# Patient Record
Sex: FEMALE | Race: WHITE | HISPANIC OR LATINO
[De-identification: names, ages, dates, MRNs, and addresses within clinical notes are randomized per-mention and may not be internally consistent; named-entity substitution may affect disease eponyms.]

---

## 2021-03-04 ENCOUNTER — RESULT REVIEW (OUTPATIENT)
Age: 61
End: 2021-03-04

## 2023-01-12 ENCOUNTER — APPOINTMENT (OUTPATIENT)
Dept: INTERNAL MEDICINE | Facility: CLINIC | Age: 63
End: 2023-01-12
Payer: COMMERCIAL

## 2023-01-12 VITALS
WEIGHT: 208 LBS | BODY MASS INDEX: 36.86 KG/M2 | HEART RATE: 83 BPM | RESPIRATION RATE: 17 BRPM | TEMPERATURE: 98 F | SYSTOLIC BLOOD PRESSURE: 122 MMHG | HEIGHT: 63 IN | DIASTOLIC BLOOD PRESSURE: 82 MMHG | OXYGEN SATURATION: 99 %

## 2023-01-12 LAB
GLUCOSE BLDC GLUCOMTR-MCNC: 133
HBA1C MFR BLD HPLC: 6.9

## 2023-01-12 PROCEDURE — 83036 HEMOGLOBIN GLYCOSYLATED A1C: CPT | Mod: QW

## 2023-01-12 PROCEDURE — 99214 OFFICE O/P EST MOD 30 MIN: CPT | Mod: 25

## 2023-01-12 PROCEDURE — 99401 PREV MED CNSL INDIV APPRX 15: CPT

## 2023-01-12 PROCEDURE — 82962 GLUCOSE BLOOD TEST: CPT

## 2023-01-14 LAB
25(OH)D3 SERPL-MCNC: 23.2 NG/ML
ALBUMIN SERPL ELPH-MCNC: 4.3 G/DL
ALP BLD-CCNC: 131 U/L
ALT SERPL-CCNC: 23 U/L
ANION GAP SERPL CALC-SCNC: 12 MMOL/L
APPEARANCE: CLEAR
AST SERPL-CCNC: 17 U/L
BASOPHILS # BLD AUTO: 0.12 K/UL
BASOPHILS NFR BLD AUTO: 1.6 %
BILIRUB SERPL-MCNC: 0.5 MG/DL
BILIRUBIN URINE: NEGATIVE
BLOOD URINE: NEGATIVE
BUN SERPL-MCNC: 9 MG/DL
CALCIUM SERPL-MCNC: 9.1 MG/DL
CHLORIDE SERPL-SCNC: 104 MMOL/L
CHOLEST SERPL-MCNC: 172 MG/DL
CO2 SERPL-SCNC: 26 MMOL/L
COLOR: YELLOW
CREAT SERPL-MCNC: 0.69 MG/DL
EGFR: 97 ML/MIN/1.73M2
EOSINOPHIL # BLD AUTO: 1.12 K/UL
EOSINOPHIL NFR BLD AUTO: 14.9 %
FOLATE SERPL-MCNC: 5.8 NG/ML
GLUCOSE QUALITATIVE U: NEGATIVE
GLUCOSE SERPL-MCNC: 140 MG/DL
HCT VFR BLD CALC: 46.3 %
HDLC SERPL-MCNC: 49 MG/DL
HGB BLD-MCNC: 14.5 G/DL
IMM GRANULOCYTES NFR BLD AUTO: 0.4 %
KETONES URINE: NEGATIVE
LDLC SERPL CALC-MCNC: 100 MG/DL
LEUKOCYTE ESTERASE URINE: NEGATIVE
LYMPHOCYTES # BLD AUTO: 2.72 K/UL
LYMPHOCYTES NFR BLD AUTO: 36.2 %
MAN DIFF?: NORMAL
MCHC RBC-ENTMCNC: 28.5 PG
MCHC RBC-ENTMCNC: 31.3 GM/DL
MCV RBC AUTO: 91.1 FL
MONOCYTES # BLD AUTO: 0.41 K/UL
MONOCYTES NFR BLD AUTO: 5.5 %
NEUTROPHILS # BLD AUTO: 3.11 K/UL
NEUTROPHILS NFR BLD AUTO: 41.4 %
NITRITE URINE: NEGATIVE
NONHDLC SERPL-MCNC: 123 MG/DL
PH URINE: 6.5
PLATELET # BLD AUTO: 283 K/UL
POTASSIUM SERPL-SCNC: 4.3 MMOL/L
PROT SERPL-MCNC: 6.8 G/DL
PROTEIN URINE: NORMAL
RBC # BLD: 5.08 M/UL
RBC # FLD: 14.4 %
SODIUM SERPL-SCNC: 142 MMOL/L
SPECIFIC GRAVITY URINE: 1.02
TRIGL SERPL-MCNC: 112 MG/DL
TSH SERPL-ACNC: 13.4 UIU/ML
UROBILINOGEN URINE: NORMAL
VIT B12 SERPL-MCNC: 867 PG/ML
WBC # FLD AUTO: 7.51 K/UL

## 2023-01-19 NOTE — ASSESSMENT
[FreeTextEntry1] : Varicose veins with ulceration left lower extremity.  Start Augmentin start mupirocin discussed with the patient did needs to continue wearing compression stockings on daily basis close to 24 hours/day.  Patient will buy DuoDERM patches discussed with the patient to change every 4 days.  Referral to vascular surgery provided for the patient patient will call.  Vies ER if condition of the ulcer is worse\par Diabetes Hemoglobin A1c 6.9 susceptible patient will bring medications that she is currently taking diabetic diet discussed with the patient 8 minutes annual ophthalmology podiatry.\par Obesity weight loss discussed with the patient\par Follow-up 1 week\par

## 2023-01-19 NOTE — HISTORY OF PRESENT ILLNESS
[FreeTextEntry1] : Rash [de-identified] : Patient presents complaining of rash on left ankle for the last few months.  Has noticed that lately has been getting worse ulcerating.  Denies pain however has uncomfortable burning itching sensation.  Patient is reporting that she had similar occurrence few years back and was treated at [facility] with topical and oral medications.\par Patient is reporting history of diabetes and requesting blood work.\par Patient takes multiple medications however does not remember the names and states she will bring it next time

## 2023-01-19 NOTE — PHYSICAL EXAM
[No Respiratory Distress] : no respiratory distress  [Clear to Auscultation] : lungs were clear to auscultation bilaterally [Normal Rate] : normal rate  [Regular Rhythm] : with a regular rhythm [Normal S1, S2] : normal S1 and S2 [Normal] : soft, non-tender, non-distended, no masses palpated, no HSM and normal bowel sounds [de-identified] : Moderate nontender protruding varicose veins bilateral lower extremities no edema [de-identified] : Lateral malleolus shallow ulceration no discharge no tenderness to palpation

## 2023-01-28 ENCOUNTER — APPOINTMENT (OUTPATIENT)
Dept: INTERNAL MEDICINE | Facility: CLINIC | Age: 63
End: 2023-01-28
Payer: COMMERCIAL

## 2023-01-28 VITALS
TEMPERATURE: 97.7 F | HEART RATE: 90 BPM | BODY MASS INDEX: 36.5 KG/M2 | OXYGEN SATURATION: 99 % | SYSTOLIC BLOOD PRESSURE: 112 MMHG | DIASTOLIC BLOOD PRESSURE: 84 MMHG | RESPIRATION RATE: 17 BRPM | WEIGHT: 206 LBS | HEIGHT: 63 IN

## 2023-01-28 DIAGNOSIS — E55.9 VITAMIN D DEFICIENCY, UNSPECIFIED: ICD-10-CM
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
## 2023-01-28 PROCEDURE — 99214 OFFICE O/P EST MOD 30 MIN: CPT | Mod: 25

## 2023-01-28 PROCEDURE — 99401 PREV MED CNSL INDIV APPRX 15: CPT

## 2023-01-28 RX ORDER — AMOXICILLIN AND CLAVULANATE POTASSIUM 875; 125 MG/1; MG/1
875-125 TABLET, COATED ORAL
Qty: 1 | Refills: 0 | Status: DISCONTINUED | COMMUNITY
Start: 2023-01-12 | End: 2023-01-28

## 2023-01-28 RX ORDER — MUPIROCIN 20 MG/G
2 OINTMENT TOPICAL TWICE DAILY
Qty: 45 | Refills: 0 | Status: DISCONTINUED | COMMUNITY
Start: 2023-01-12 | End: 2023-01-28

## 2023-01-28 NOTE — HISTORY OF PRESENT ILLNESS
[FreeTextEntry1] : Follow-up [de-identified] : Patient presents for follow-up diabetes hyperlipidemia hypothyroidism.\par Patient admits to not taking medications daily was out of meds at some point\par Hyperlipidemia total cholesterol 172 HDL 49  triglycerides 112\par EGFR 97 alk phos 131\par TSH 13.  Patient is reporting being treated for hypothyroidism with levothyroxine however now.  Does admit to weight gain and some fatigue\par Patient is reporting that left leg also is significantly better with using Hydro colloidal patches and has appointment with vascular next week\par Status post recent ER visit for itchy rash on arms and legs was using antibiotic tablets?

## 2023-01-28 NOTE — PHYSICAL EXAM
[No Respiratory Distress] : no respiratory distress  [Clear to Auscultation] : lungs were clear to auscultation bilaterally [Normal] : soft, non-tender, non-distended, no masses palpated, no HSM and normal bowel sounds [de-identified] : Multiple protruding varicose veins bilaterally left lateral ulceration significantly better no longer can see also elevation erythema with scaling [de-identified] : Generalized group papules in different stages erythematous with excoriations on upper and lower extremities

## 2023-01-28 NOTE — ASSESSMENT
[FreeTextEntry1] : Diabetes we will continue metformin for now advised to take daily diabetic diet discussed with the patient 8 minutes annual ophthalmology checkup discussed with the patient we will repeat in 3 months\par Hypothyroidism we will start levothyroxine 50 mcg daily 30 minutes before breakfast.  We will repeat TFTs in 2 months\par Vitamin D deficiency supplement\par Sick bite suspect bedbugs advised patient to inspect mattress and consider moving mattress sterilize all clothes and shoes in hot water.  Use betamethasone cream as needed for itching\par Hyperlipidemia continue statin daily will repeat in 2-month fasting\par Varicose veins with left leg ulceration significantly improved continue patches follow-up vascular surgery follow-up 2-month/earlier if rash persists\par

## 2023-03-25 ENCOUNTER — APPOINTMENT (OUTPATIENT)
Dept: INTERNAL MEDICINE | Facility: CLINIC | Age: 63
End: 2023-03-25

## 2023-04-03 ENCOUNTER — RX RENEWAL (OUTPATIENT)
Age: 63
End: 2023-04-03

## 2023-04-15 ENCOUNTER — LABORATORY RESULT (OUTPATIENT)
Age: 63
End: 2023-04-15

## 2023-04-15 ENCOUNTER — APPOINTMENT (OUTPATIENT)
Dept: INTERNAL MEDICINE | Facility: CLINIC | Age: 63
End: 2023-04-15
Payer: COMMERCIAL

## 2023-04-15 VITALS
RESPIRATION RATE: 17 BRPM | SYSTOLIC BLOOD PRESSURE: 120 MMHG | WEIGHT: 211 LBS | OXYGEN SATURATION: 100 % | DIASTOLIC BLOOD PRESSURE: 80 MMHG | TEMPERATURE: 97.5 F | HEART RATE: 79 BPM | HEIGHT: 63 IN | BODY MASS INDEX: 37.39 KG/M2

## 2023-04-15 PROCEDURE — 99214 OFFICE O/P EST MOD 30 MIN: CPT | Mod: 25

## 2023-04-15 NOTE — HISTORY OF PRESENT ILLNESS
[FreeTextEntry1] : Follow-up [de-identified] : Patient presents complaining of intensely pruritic rash over legs arms and chest for the last month.  Patient has had similar rash in the legs however resolved.  Has been using betamethasone without significant improvement.  Itching interferes with sleep\par Varicose veins status post surgical intervention significant improvement in pain and left leg ulcer\par Patient is complaining of left lower quadrant pelvic discomfort for the last few years mild intermittent no change in bowel movements no constipation diarrhea weight loss urinary symptoms vaginal bleeding nausea vomiting etc.

## 2023-04-15 NOTE — PHYSICAL EXAM
[Normal] : no acute distress, well nourished, well developed and well-appearing [No Respiratory Distress] : no respiratory distress  [No Edema] : there was no peripheral edema [de-identified] : Varicose veins bilateral lower extremities almost healed left ankle ulcer pinkish skin lateral malleolus [de-identified] : Diffuse papules some on erythematous base some grouped in different stages of healing some with excoriations

## 2023-04-15 NOTE — ASSESSMENT
[FreeTextEntry1] : Rash unclear etiology clobetasol hydroxyzine referral to dermatology check labs for allergies.  Discussed with the patient possibility of insect bites advised to inspect linens in the whole house for insects\par Diabetes stable continue current regiment\par Hyperlipidemia continue statin\par Hypothyroidism continue current regimen clinically euthyroid check labs\par Varicose veins with left leg ulcer status post surgical intervention doing well continue compression stockings follow-up vascular\par Pelvic pain chronic check UA urine culture we will return for pelvic examination will need GI colonoscopy will evaluate for imaging next visit\par Follow-up 2 weeks

## 2023-04-20 LAB
A ALTERNATA IGE QN: 0.41 KUA/L
A FUMIGATUS IGE QN: 0.72 KUA/L
APPEARANCE: CLEAR
BARLEY IGE QN: 40.2 KUA/L
BERMUDA GRASS IGE QN: 58.8 KUA/L
BILIRUBIN URINE: NEGATIVE
BLOOD URINE: NEGATIVE
BOXELDER IGE QN: 55.5 KUA/L
C HERBARUM IGE QN: 0.75 KUA/L
CALIF WALNUT IGE QN: 40.4 KUA/L
CAT DANDER IGE QN: 0.88 KUA/L
CHERRY IGE QN: 41.9 KUA/L
CMN PIGWEED IGE QN: 49.8 KUA/L
COLOR: NORMAL
COMMON RAGWEED IGE QN: 63.1 KUA/L
COTTONWOOD IGE QN: 48.7 KUA/L
COW MILK IGE QN: 0.23 KUA/L
CRAB IGE QN: 7.3 KUA/L
D FARINAE IGE QN: 7.77 KUA/L
D PTERONYSS IGE QN: 4.36 KUA/L
DEPRECATED A ALTERNATA IGE RAST QL: 1
DEPRECATED A FUMIGATUS IGE RAST QL: 2
DEPRECATED BARLEY IGE RAST QL: 4
DEPRECATED BERMUDA GRASS IGE RAST QL: 5
DEPRECATED BOXELDER IGE RAST QL: 5
DEPRECATED C HERBARUM IGE RAST QL: 2
DEPRECATED CAT DANDER IGE RAST QL: 2
DEPRECATED CHERRY IGE RAST QL: 4
DEPRECATED COMMON PIGWEED IGE RAST QL: 4
DEPRECATED COMMON RAGWEED IGE RAST QL: 5
DEPRECATED COTTONWOOD IGE RAST QL: 4
DEPRECATED COW MILK IGE RAST QL: NORMAL
DEPRECATED CRAB IGE RAST QL: 3
DEPRECATED D FARINAE IGE RAST QL: 3
DEPRECATED D PTERONYSS IGE RAST QL: 3
DEPRECATED DOG DANDER IGE RAST QL: 2
DEPRECATED EGG WHITE IGE RAST QL: 1
DEPRECATED GOOSEFOOT IGE RAST QL: 5
DEPRECATED LONDON PLANE IGE RAST QL: 4
DEPRECATED MOUSE URINE PROT IGE RAST QL: 1
DEPRECATED MUGWORT IGE RAST QL: 4
DEPRECATED OAT IGE RAST QL: 4
DEPRECATED P NOTATUM IGE RAST QL: 1
DEPRECATED PEANUT IGE RAST QL: 4
DEPRECATED RED CEDAR IGE RAST QL: 4
DEPRECATED ROACH IGE RAST QL: 4
DEPRECATED RYE IGE RAST QL: 4
DEPRECATED SHEEP SORREL IGE RAST QL: 4
DEPRECATED SILVER BIRCH IGE RAST QL: 4
DEPRECATED SOYBEAN IGE RAST QL: 4
DEPRECATED TIMOTHY IGE RAST QL: 4
DEPRECATED WHEAT IGE RAST QL: 4
DEPRECATED WHITE ASH IGE RAST QL: 4
DEPRECATED WHITE OAK IGE RAST QL: 4
DOG DANDER IGE QN: 1.99 KUA/L
EGG WHITE IGE QN: 0.36 KUA/L
GLUCOSE QUALITATIVE U: NEGATIVE
GOOSEFOOT IGE QN: 51 KUA/L
KETONES URINE: NEGATIVE
LEUKOCYTE ESTERASE URINE: NEGATIVE
LONDON PLANE IGE QN: 37.7 KUA/L
MOUSE URINE PROT IGE QN: 0.35 KUA/L
MUGWORT IGE QN: 47.8 KUA/L
MULBERRY (T70) CLASS: 4
MULBERRY (T70) CONC: 20.3 KUA/L
NITRITE URINE: NEGATIVE
OAT IGE QN: 26.9 KUA/L
P NOTATUM IGE QN: 0.5 KUA/L
PEANUT IGE QN: 40.8 KUA/L
PH URINE: 7
PROTEIN URINE: NEGATIVE
RED CEDAR IGE QN: 23.1 KUA/L
ROACH IGE QN: 29.7 KUA/L
RYE IGE QN: 33.8 KUA/L
SHEEP SORREL IGE QN: 40.7 KUA/L
SILVER BIRCH IGE QN: 44.5 KUA/L
SOYBEAN IGE QN: 31.9 KUA/L
SPECIFIC GRAVITY URINE: 1.01
TIMOTHY IGE QN: 39.5 KUA/L
TOTAL IGE SMQN RAST: 4834 KU/L
TREE ALLERG MIX1 IGE QL: 4
TSH SERPL-ACNC: 13.5 UIU/ML
UROBILINOGEN URINE: NORMAL
WHEAT IGE QN: 37 KUA/L
WHITE ASH IGE QN: 44 KUA/L
WHITE ELM IGE QN: 5
WHITE ELM IGE QN: 60.2 KUA/L
WHITE OAK IGE QN: 39.1 KUA/L

## 2023-04-24 ENCOUNTER — NON-APPOINTMENT (OUTPATIENT)
Age: 63
End: 2023-04-24

## 2023-04-27 ENCOUNTER — APPOINTMENT (OUTPATIENT)
Dept: INTERNAL MEDICINE | Facility: CLINIC | Age: 63
End: 2023-04-27
Payer: COMMERCIAL

## 2023-04-27 VITALS
WEIGHT: 211 LBS | DIASTOLIC BLOOD PRESSURE: 80 MMHG | TEMPERATURE: 97.2 F | SYSTOLIC BLOOD PRESSURE: 126 MMHG | RESPIRATION RATE: 18 BRPM | HEART RATE: 80 BPM | HEIGHT: 63 IN | OXYGEN SATURATION: 100 % | BODY MASS INDEX: 37.39 KG/M2

## 2023-04-27 DIAGNOSIS — Z00.00 ENCOUNTER FOR GENERAL ADULT MEDICAL EXAMINATION W/OUT ABNORMAL FINDINGS: ICD-10-CM

## 2023-04-27 DIAGNOSIS — Z01.419 ENCOUNTER FOR GYNECOLOGICAL EXAMINATION (GENERAL) (ROUTINE) W/OUT ABNORMAL FINDINGS: ICD-10-CM

## 2023-04-27 DIAGNOSIS — R21 RASH AND OTHER NONSPECIFIC SKIN ERUPTION: ICD-10-CM

## 2023-04-27 PROCEDURE — G0101: CPT

## 2023-04-27 PROCEDURE — 99214 OFFICE O/P EST MOD 30 MIN: CPT | Mod: 25

## 2023-04-27 RX ORDER — HYDROXYZINE HYDROCHLORIDE 25 MG/1
25 TABLET ORAL TWICE DAILY
Qty: 60 | Refills: 3 | Status: DISCONTINUED | COMMUNITY
Start: 2023-04-15 | End: 2023-04-27

## 2023-04-27 RX ORDER — LORATADINE 10 MG/1
10 TABLET ORAL
Qty: 30 | Refills: 3 | Status: ACTIVE | COMMUNITY
Start: 2023-04-27 | End: 1900-01-01

## 2023-04-27 NOTE — HISTORY OF PRESENT ILLNESS
[Other: _____] : [unfilled] [FreeTextEntry1] : papenedeliaear [de-identified] : A0 presents  for  papsmear \par  LMP 15  y/o \par  pt  had last  pap  3 y/o was  neg \par Patient is no longer sexually active\par  has hx  of fibroids ? ,  pt is recalling  surgery to remove  fibroids 10 y/o \par  pt c/o left  sided  lower  abd pain  for the last  5 + years \par  mild \par  constant \par  not assoc with food , bm \par  no d/c  , dysuria ,   vag bleeding etc \par  colon 8 y/o  was  nl \par Follow-up for blood work.  Patient had allergy testing due to recurrent generalized rash.  Testing came back positive for multiple food allergies cherries peanuts egg whites soy wheat crabs oats barley rye.  Upper respiratory panel revealed allergies to dust mite cockroaches mold multiple trees grasses patient states she improved to a degree with hydroxyzine but developed dizziness as a side effect.\par Hypothyroidism TSH 13 Free T41.3.  Patient admits to frequently missing the medication.

## 2023-05-04 LAB
C TRACH RRNA SPEC QL NAA+PROBE: NOT DETECTED
CANDIDA VAG CYTO: NOT DETECTED
CYTOLOGY CVX/VAG DOC THIN PREP: NORMAL
G VAGINALIS+PREV SP MTYP VAG QL MICRO: NOT DETECTED
HPV HIGH+LOW RISK DNA PNL CVX: NOT DETECTED
N GONORRHOEA RRNA SPEC QL NAA+PROBE: NOT DETECTED
SOURCE TP AMPLIFICATION: NORMAL
T VAGINALIS VAG QL WET PREP: NOT DETECTED

## 2023-05-12 ENCOUNTER — RX RENEWAL (OUTPATIENT)
Age: 63
End: 2023-05-12

## 2023-05-13 ENCOUNTER — APPOINTMENT (OUTPATIENT)
Dept: INTERNAL MEDICINE | Facility: CLINIC | Age: 63
End: 2023-05-13
Payer: COMMERCIAL

## 2023-05-13 VITALS
HEIGHT: 63 IN | HEART RATE: 82 BPM | RESPIRATION RATE: 17 BRPM | OXYGEN SATURATION: 98 % | TEMPERATURE: 97.4 F | DIASTOLIC BLOOD PRESSURE: 80 MMHG | WEIGHT: 211 LBS | BODY MASS INDEX: 37.39 KG/M2 | SYSTOLIC BLOOD PRESSURE: 110 MMHG

## 2023-05-13 LAB
GLUCOSE BLDC GLUCOMTR-MCNC: 133
HBA1C MFR BLD HPLC: 6.4

## 2023-05-13 PROCEDURE — 99401 PREV MED CNSL INDIV APPRX 15: CPT

## 2023-05-13 PROCEDURE — 82962 GLUCOSE BLOOD TEST: CPT

## 2023-05-13 PROCEDURE — 99214 OFFICE O/P EST MOD 30 MIN: CPT | Mod: 25

## 2023-05-13 PROCEDURE — 83036 HEMOGLOBIN GLYCOSYLATED A1C: CPT | Mod: QW

## 2023-05-18 RX ORDER — CLOBETASOL PROPIONATE 0.5 MG/G
0.05 CREAM TOPICAL TWICE DAILY
Qty: 60 | Refills: 0 | Status: DISCONTINUED | COMMUNITY
Start: 2023-04-15 | End: 2023-05-18

## 2023-05-18 RX ORDER — BETAMETHASONE DIPROPIONATE 0.5 MG/G
0.05 CREAM, AUGMENTED TOPICAL
Qty: 80 | Refills: 0 | Status: DISCONTINUED | COMMUNITY
Start: 2023-01-28 | End: 2023-05-18

## 2023-05-18 NOTE — PHYSICAL EXAM
[Normal] : normal rate, regular rhythm, normal S1 and S2 and no murmur heard [No Edema] : there was no peripheral edema [Soft] : abdomen soft [Non Tender] : non-tender [Non-distended] : non-distended [de-identified] : Varicose veins lower extremities

## 2023-05-18 NOTE — HISTORY OF PRESENT ILLNESS
[FreeTextEntry1] : Follow-up [de-identified] : Patient presents for follow-up hyperlipidemia diabetes hypothyroidism allergies.  Patient is reporting complete resolution of skin rash.  Was unable to see allergist as of yet due to limited availability of specialists.  Overall feeling well no acute complaints.  No longer using clobetasol

## 2023-05-18 NOTE — ASSESSMENT
[FreeTextEntry1] : Diabetes hemoglobin A1c 6.4 down from 6.9 stable we will continue current regimen diabetic education discussed with the patient 8 minutes\par Hyperlipidemia continue statin check fasting lipids.  Intervention pending results\par Hypothyroidism clinically euthyroid continue current regimen check TSH free T4 intervention pending results\par Multiple respiratory and food allergies clinically stable currently.  Patient is aware to see allergist\par Varicose veins stable continue compression stockings continue follow-ups with vascular surgery\par Follow-up 3 months/pending results

## 2023-05-20 LAB
ALBUMIN SERPL ELPH-MCNC: 4.1 G/DL
ALP BLD-CCNC: 113 U/L
ALT SERPL-CCNC: 27 U/L
ANION GAP SERPL CALC-SCNC: 14 MMOL/L
AST SERPL-CCNC: 17 U/L
BILIRUB SERPL-MCNC: 0.4 MG/DL
BUN SERPL-MCNC: 13 MG/DL
CALCIUM SERPL-MCNC: 9.2 MG/DL
CHLORIDE SERPL-SCNC: 105 MMOL/L
CHOLEST SERPL-MCNC: 211 MG/DL
CO2 SERPL-SCNC: 24 MMOL/L
CREAT SERPL-MCNC: 0.63 MG/DL
CREAT SPEC-SCNC: 205 MG/DL
EGFR: 100 ML/MIN/1.73M2
GLUCOSE SERPL-MCNC: 140 MG/DL
HDLC SERPL-MCNC: 55 MG/DL
LDLC SERPL CALC-MCNC: 132 MG/DL
MICROALBUMIN 24H UR DL<=1MG/L-MCNC: 2.1 MG/DL
MICROALBUMIN/CREAT 24H UR-RTO: 10 MG/G
NONHDLC SERPL-MCNC: 156 MG/DL
POTASSIUM SERPL-SCNC: 4.2 MMOL/L
PROT SERPL-MCNC: 6.8 G/DL
SODIUM SERPL-SCNC: 142 MMOL/L
T4 FREE SERPL-MCNC: 1.5 NG/DL
TRIGL SERPL-MCNC: 120 MG/DL
TSH SERPL-ACNC: 9.31 UIU/ML

## 2023-05-22 ENCOUNTER — NON-APPOINTMENT (OUTPATIENT)
Age: 63
End: 2023-05-22

## 2023-07-13 ENCOUNTER — RX RENEWAL (OUTPATIENT)
Age: 63
End: 2023-07-13

## 2023-08-12 ENCOUNTER — APPOINTMENT (OUTPATIENT)
Dept: INTERNAL MEDICINE | Facility: CLINIC | Age: 63
End: 2023-08-12

## 2023-09-22 ENCOUNTER — RX RENEWAL (OUTPATIENT)
Age: 63
End: 2023-09-22

## 2023-10-26 ENCOUNTER — LABORATORY RESULT (OUTPATIENT)
Age: 63
End: 2023-10-26

## 2023-10-26 ENCOUNTER — APPOINTMENT (OUTPATIENT)
Dept: INTERNAL MEDICINE | Facility: CLINIC | Age: 63
End: 2023-10-26
Payer: MEDICAID

## 2023-10-26 VITALS
HEIGHT: 63 IN | TEMPERATURE: 96.9 F | HEART RATE: 62 BPM | BODY MASS INDEX: 35.79 KG/M2 | DIASTOLIC BLOOD PRESSURE: 80 MMHG | SYSTOLIC BLOOD PRESSURE: 130 MMHG | RESPIRATION RATE: 17 BRPM | WEIGHT: 202 LBS | OXYGEN SATURATION: 99 %

## 2023-10-26 DIAGNOSIS — D21.9 BENIGN NEOPLASM OF CONNECTIVE AND OTHER SOFT TISSUE, UNSPECIFIED: ICD-10-CM

## 2023-10-26 DIAGNOSIS — D25.9 LEIOMYOMA OF UTERUS, UNSPECIFIED: ICD-10-CM

## 2023-10-26 DIAGNOSIS — R10.2 PELVIC AND PERINEAL PAIN: ICD-10-CM

## 2023-10-26 LAB
GLUCOSE BLDC GLUCOMTR-MCNC: 133
HBA1C MFR BLD HPLC: 6.8

## 2023-10-26 PROCEDURE — 82962 GLUCOSE BLOOD TEST: CPT

## 2023-10-26 PROCEDURE — 99214 OFFICE O/P EST MOD 30 MIN: CPT | Mod: 25

## 2023-10-26 PROCEDURE — 83036 HEMOGLOBIN GLYCOSYLATED A1C: CPT | Mod: QW

## 2023-10-26 RX ORDER — NAPROXEN 500 MG/1
500 TABLET ORAL
Qty: 60 | Refills: 0 | Status: ACTIVE | COMMUNITY
Start: 2023-10-26 | End: 1900-01-01

## 2023-10-28 LAB
ALBUMIN SERPL ELPH-MCNC: 4.3 G/DL
ALP BLD-CCNC: 125 U/L
ALT SERPL-CCNC: 36 U/L
ANION GAP SERPL CALC-SCNC: 11 MMOL/L
AST SERPL-CCNC: 25 U/L
BILIRUB SERPL-MCNC: 0.5 MG/DL
BUN SERPL-MCNC: 8 MG/DL
CALCIUM SERPL-MCNC: 9.3 MG/DL
CHLORIDE SERPL-SCNC: 101 MMOL/L
CHOLEST SERPL-MCNC: 249 MG/DL
CO2 SERPL-SCNC: 29 MMOL/L
CREAT SERPL-MCNC: 0.59 MG/DL
CREAT SPEC-SCNC: 55 MG/DL
EGFR: 101 ML/MIN/1.73M2
GLUCOSE SERPL-MCNC: 145 MG/DL
HDLC SERPL-MCNC: 45 MG/DL
LDLC SERPL CALC-MCNC: 168 MG/DL
MICROALBUMIN 24H UR DL<=1MG/L-MCNC: <1.2 MG/DL
MICROALBUMIN/CREAT 24H UR-RTO: NORMAL MG/G
NONHDLC SERPL-MCNC: 204 MG/DL
POTASSIUM SERPL-SCNC: 4 MMOL/L
PROT SERPL-MCNC: 7.2 G/DL
SODIUM SERPL-SCNC: 141 MMOL/L
T4 FREE SERPL-MCNC: 1.5 NG/DL
TRIGL SERPL-MCNC: 196 MG/DL
TSH SERPL-ACNC: 16.8 UIU/ML

## 2023-11-02 PROBLEM — R10.2 PELVIC PAIN: Status: ACTIVE | Noted: 2023-04-15

## 2023-11-02 PROBLEM — D21.9 FIBROIDS: Status: ACTIVE | Noted: 2023-04-27

## 2024-01-25 ENCOUNTER — APPOINTMENT (OUTPATIENT)
Dept: INTERNAL MEDICINE | Facility: CLINIC | Age: 64
End: 2024-01-25
Payer: MEDICAID

## 2024-01-25 VITALS
WEIGHT: 205 LBS | RESPIRATION RATE: 17 BRPM | TEMPERATURE: 98.2 F | HEIGHT: 63 IN | BODY MASS INDEX: 36.32 KG/M2 | OXYGEN SATURATION: 98 % | SYSTOLIC BLOOD PRESSURE: 130 MMHG | HEART RATE: 81 BPM | DIASTOLIC BLOOD PRESSURE: 80 MMHG

## 2024-01-25 DIAGNOSIS — M25.511 PAIN IN RIGHT SHOULDER: ICD-10-CM

## 2024-01-25 DIAGNOSIS — K76.0 FATTY (CHANGE OF) LIVER, NOT ELSEWHERE CLASSIFIED: ICD-10-CM

## 2024-01-25 DIAGNOSIS — E03.9 HYPOTHYROIDISM, UNSPECIFIED: ICD-10-CM

## 2024-01-25 DIAGNOSIS — I83.209 VARICOSE VEINS OF UNSPECIFIED LOWER EXTREMITY WITH BOTH ULCER OF UNSPECIFIED SITE AND INFLAMMATION: ICD-10-CM

## 2024-01-25 DIAGNOSIS — E11.9 TYPE 2 DIABETES MELLITUS W/OUT COMPLICATIONS: ICD-10-CM

## 2024-01-25 DIAGNOSIS — R07.0 PAIN IN THROAT: ICD-10-CM

## 2024-01-25 DIAGNOSIS — L97.909 VARICOSE VEINS OF UNSPECIFIED LOWER EXTREMITY WITH BOTH ULCER OF UNSPECIFIED SITE AND INFLAMMATION: ICD-10-CM

## 2024-01-25 DIAGNOSIS — E78.5 HYPERLIPIDEMIA, UNSPECIFIED: ICD-10-CM

## 2024-01-25 DIAGNOSIS — J30.9 ALLERGIC RHINITIS, UNSPECIFIED: ICD-10-CM

## 2024-01-25 LAB
GLUCOSE BLDC GLUCOMTR-MCNC: 132
HBA1C MFR BLD HPLC: 6.9
S PYO AG SPEC QL IA: NORMAL

## 2024-01-25 PROCEDURE — 99214 OFFICE O/P EST MOD 30 MIN: CPT | Mod: 25

## 2024-01-25 PROCEDURE — 99401 PREV MED CNSL INDIV APPRX 15: CPT

## 2024-01-25 PROCEDURE — 87880 STREP A ASSAY W/OPTIC: CPT | Mod: QW

## 2024-01-25 PROCEDURE — 83036 HEMOGLOBIN GLYCOSYLATED A1C: CPT | Mod: QW

## 2024-01-25 PROCEDURE — 82962 GLUCOSE BLOOD TEST: CPT

## 2024-01-25 RX ORDER — LEVOTHYROXINE SODIUM 0.05 MG/1
50 TABLET ORAL
Qty: 90 | Refills: 1 | Status: ACTIVE | COMMUNITY
Start: 2023-01-28 | End: 1900-01-01

## 2024-01-25 RX ORDER — METFORMIN ER 750 MG 750 MG/1
750 TABLET ORAL
Qty: 180 | Refills: 2 | Status: ACTIVE | COMMUNITY
Start: 2023-01-28 | End: 1900-01-01

## 2024-01-25 RX ORDER — ATORVASTATIN CALCIUM 40 MG/1
40 TABLET, FILM COATED ORAL
Qty: 90 | Refills: 1 | Status: ACTIVE | COMMUNITY
Start: 2023-01-28 | End: 1900-01-01

## 2024-01-27 LAB
ALBUMIN SERPL ELPH-MCNC: 4.2 G/DL
ALP BLD-CCNC: 128 U/L
ALT SERPL-CCNC: 31 U/L
ANION GAP SERPL CALC-SCNC: 12 MMOL/L
AST SERPL-CCNC: 19 U/L
BILIRUB SERPL-MCNC: 0.4 MG/DL
BUN SERPL-MCNC: 10 MG/DL
CALCIUM SERPL-MCNC: 9.1 MG/DL
CHLORIDE SERPL-SCNC: 101 MMOL/L
CHOLEST SERPL-MCNC: 234 MG/DL
CO2 SERPL-SCNC: 24 MMOL/L
CREAT SERPL-MCNC: 0.58 MG/DL
EGFR: 101 ML/MIN/1.73M2
GLUCOSE SERPL-MCNC: 133 MG/DL
HDLC SERPL-MCNC: 47 MG/DL
LDLC SERPL CALC-MCNC: 163 MG/DL
NONHDLC SERPL-MCNC: 187 MG/DL
POTASSIUM SERPL-SCNC: 4 MMOL/L
PROT SERPL-MCNC: 7 G/DL
RAPID RVP RESULT: NOT DETECTED
SARS-COV-2 RNA PNL RESP NAA+PROBE: NOT DETECTED
SODIUM SERPL-SCNC: 138 MMOL/L
T4 FREE SERPL-MCNC: 1.3 NG/DL
TRIGL SERPL-MCNC: 129 MG/DL
TSH SERPL-ACNC: 17.2 UIU/ML

## 2024-02-01 PROBLEM — E11.9 DIABETES: Status: ACTIVE | Noted: 2023-01-19

## 2024-02-01 PROBLEM — E03.9 HYPOTHYROIDISM: Status: ACTIVE | Noted: 2023-01-28

## 2024-02-01 PROBLEM — J30.9 MULTIPLE RESPIRATORY ALLERGIES: Status: ACTIVE | Noted: 2023-04-20

## 2024-02-01 PROBLEM — K76.0 FATTY LIVER: Status: ACTIVE | Noted: 2023-11-02

## 2024-02-01 PROBLEM — I83.209 VARICOSE VEINS WITH ULCER AND INFLAMMATION: Status: ACTIVE | Noted: 2023-01-12

## 2024-02-01 NOTE — ASSESSMENT
[FreeTextEntry1] : Hyperlipidemia statin refill compliance reinforced check lipids LFTs Throat pain rapid strep negative likely viral supportive treatment.  Check respiratory viral panel Right shoulder pain referral for x-ray start naproxen 500 mg twice daily with food Obesity weight loss advised Diabetes hemoglobin A1c done today 6.9 restart metformin diabetic education discussed with the patient 8 minutes Fatty liver weight loss discussed with the patient at least 10% of current body weight.  Progression to fibrosis and cirrhosis discussed with the patient check Bennett. Hypothyroidism restart levothyroxine check TSH free T4.  Clinically doing okay Multiple respiratory allergies stable Varicose veins status post surgical ablation doing well Follow-up 3 months/pending results//earlier if throat pain will persist

## 2024-02-01 NOTE — HISTORY OF PRESENT ILLNESS
[de-identified] : Patient presents for follow-up diabetes fatty liver hyperlipidemia hypothyroidism varicose veins.  Patient is reporting that she has been out of all her medications due to some insurance issues.  Today complaining of upper respiratory symptoms voice loss and throat pain for the last 5 days no fever chest pain shortness of breath.  Has not tried any over-the-counter remedies [FreeTextEntry1] : Follow-up

## 2024-02-06 ENCOUNTER — APPOINTMENT (OUTPATIENT)
Dept: PHYSICAL MEDICINE AND REHAB | Facility: CLINIC | Age: 64
End: 2024-02-06
Payer: MEDICAID

## 2024-02-06 VITALS
DIASTOLIC BLOOD PRESSURE: 76 MMHG | SYSTOLIC BLOOD PRESSURE: 128 MMHG | OXYGEN SATURATION: 98 % | BODY MASS INDEX: 36.32 KG/M2 | RESPIRATION RATE: 18 BRPM | WEIGHT: 205 LBS | HEIGHT: 63 IN | HEART RATE: 78 BPM | TEMPERATURE: 97.6 F

## 2024-02-06 PROCEDURE — 99204 OFFICE O/P NEW MOD 45 MIN: CPT

## 2024-02-06 RX ORDER — NAPROXEN 500 MG/1
500 TABLET ORAL
Qty: 60 | Refills: 0 | Status: ACTIVE | COMMUNITY
Start: 2024-02-06 | End: 1900-01-01

## 2024-02-18 NOTE — DATA REVIEWED
[Other: ___] : [unfilled] [FreeTextEntry1] : Hemoglobin A1c is 6.8-avoid steroids if feasible BUNs/CR 8/0.59.  Patient should be able to tolerate NSAIDs

## 2024-02-18 NOTE — REVIEW OF SYSTEMS
[Fever] : no fever [Eye Pain] : no eye pain [Earache] : no earache [Chest Pain] : no chest pain [Shortness Of Breath] : no shortness of breath [Abdominal Pain] : no abdominal pain [Dysuria] : no dysuria [Joint Pain] : joint pain [Joint Stiffness] : joint stiffness [Muscle Pain] : muscle pain [Muscle Weakness] : muscle weakness [Skin Rash] : no skin rash [Skin Wound] : no skin wound [Dizziness] : no dizziness [Insomnia] : no insomnia [Easy Bruising] : no tendency for easy bruising

## 2024-02-18 NOTE — PHYSICAL EXAM
[FreeTextEntry1] : Pleasant, in no distress. Language: English HEENT: Head: no trauma. Eyes: no discharge. Ears: No discharge. Nose No discharge. Throat: clear Neck: AROM Patient to the right is 0 to 60 degrees.  Rotation to the left is 0 to 70 degrees.  Tenderness over the right paracervical musculature.. Positive Spurling's to the right. Heart: RR, +S1, S2 Lungs: CTA Abdomen: soft, NT Lumbar spine: FAROM, no spasm  LUE: Shoulder:FAROM, MS 5/5 Elbow: FAROM, MS 5/5 reflexes 2/4 Wrist: FAROM, MS 5/5 reflexes 2/4 Warm, nontender, pulse 2+  RUE:Shoulder:AROM 0-100.  Tender to palpation.  Internal rotation is 0-0, MS 3/5 Elbow: FAROM, MS 5/5 reflexes 2/4 Wrist: FAROM, MS 5/5 reflexes 2/4 Warm, nontender, pulse 2+  LLE: Hip: FAROM, MS 5/5 Knee: FAROM, MS 5/5 reflexes 2/4 Ankle: FAROM, MS 5/5 reflexes 2/4 Warm , nontender, pulse 2+ negative homans  RLE: Hip: FAROM, MS 5/5 Knee: FAROM, MS 5/5 reflexes 2/4 Ankle: FAROM, MS 5/5 reflexes 2/4 Warm , nontender, pulse 2+ negative homans  Gait: Spontaneous, reciprocal, safe without an assistive device  Sensation RUE: sensation is intact to light touch, pinprick  and proprioception LUE: sensation is intact to light touch, pinprick  and proprioception RLE: sensation is intact to light touch, pinprick  and proprioception. Neg SLR. Neg MARIANA, Neg FADIR LLE: sensation is intact to light touch, pinprick  and proprioception. Neg SLR. Neg MARIANA, Neg FADIR

## 2024-03-05 ENCOUNTER — APPOINTMENT (OUTPATIENT)
Dept: PHYSICAL MEDICINE AND REHAB | Facility: CLINIC | Age: 64
End: 2024-03-05
Payer: MEDICAID

## 2024-03-05 VITALS
WEIGHT: 204 LBS | HEART RATE: 80 BPM | SYSTOLIC BLOOD PRESSURE: 132 MMHG | DIASTOLIC BLOOD PRESSURE: 80 MMHG | TEMPERATURE: 97.4 F | OXYGEN SATURATION: 99 % | HEIGHT: 63 IN | RESPIRATION RATE: 18 BRPM | BODY MASS INDEX: 36.14 KG/M2

## 2024-03-05 DIAGNOSIS — M50.10 CERVICAL DISC DISORDER WITH RADICULOPATHY, UNSPECIFIED CERVICAL REGION: ICD-10-CM

## 2024-03-05 DIAGNOSIS — M77.8 OTHER ENTHESOPATHIES, NOT ELSEWHERE CLASSIFIED: ICD-10-CM

## 2024-03-05 PROCEDURE — 99213 OFFICE O/P EST LOW 20 MIN: CPT

## 2024-03-05 RX ORDER — METHYLPREDNISOLONE 4 MG/1
4 TABLET ORAL
Qty: 1 | Refills: 0 | Status: ACTIVE | COMMUNITY
Start: 2024-03-05 | End: 1900-01-01

## 2024-03-05 RX ORDER — ACETAMINOPHEN 500 MG/1
500 TABLET ORAL 3 TIMES DAILY
Qty: 90 | Refills: 0 | Status: ACTIVE | COMMUNITY
Start: 2024-03-05 | End: 1900-01-01

## 2024-03-12 ENCOUNTER — APPOINTMENT (OUTPATIENT)
Dept: PHYSICAL MEDICINE AND REHAB | Facility: CLINIC | Age: 64
End: 2024-03-12

## 2024-03-12 PROBLEM — M50.10 CERVICAL DISC DISORDER WITH RADICULOPATHY: Status: ACTIVE | Noted: 2024-02-06

## 2024-03-12 PROBLEM — M77.8 TENDINITIS OF RIGHT SHOULDER: Status: ACTIVE | Noted: 2024-02-06

## 2024-04-02 NOTE — HISTORY OF PRESENT ILLNESS
[FreeTextEntry1] : 63 y/o female presents with neck pain radiating down the right arm  for 3 months. No falls or accident   She went to restorative therapy 2 x week for 1 month for 8 sessions.  Therapy has reduced her pain and increased the range of motion of her arm.  She is taking less pain medication.   from using naproxen for pain controlShe is getting slightly upset stomach  Pain:  7/10 Worse: 10/10 Quality: electricity  Frequency: constant The pain starts in the right side of neck and goes to the right hand .  Pain is unable to raise her right arm above the shoulder  The pain is worse with right rotation of neck  She has dropped objects from her right hand  She has numbness in her fingertips of the right hand Acetaminophen 500 mg 2 tablets po  1 x day.

## 2024-04-02 NOTE — PHYSICAL EXAM
[FreeTextEntry1] : Pleasant, in no distress. Language: English HEENT: Head: no trauma. Eyes: no discharge. Ears: No discharge. Nose No discharge. Throat: clear Neck: AROM Patient to the right is 0 to 60 degrees.  Rotation to the left is 0 to 70 degrees.  Tenderness over the right paracervical musculature.. Positive Spurling's to the right. Heart: RR, +S1, S2 Lungs: CTA Abdomen: soft, NT Lumbar spine: FAROM, no spasm  LUE: Shoulder:FAROM, MS 5/5 Elbow: FAROM, MS 5/5 reflexes 2/4 Wrist: FAROM, MS 5/5 reflexes 2/4 Warm, nontender, pulse 2+  RUE:Shoulder:AROM 0-100.  Tender to palpation.  Internal rotation is 0-0, MS 3/5 Elbow: FAROM, MS 5/5 reflexes 2/4 Wrist: FAROM, MS 5/5 reflexes 2/4 Negative Tinel's. Warm, nontender, pulse 2+  LLE: Hip: FAROM, MS 5/5 Knee: FAROM, MS 5/5 reflexes 2/4 Ankle: FAROM, MS 5/5 reflexes 2/4 Warm , nontender, pulse 2+ negative homans  RLE: Hip: FAROM, MS 5/5 Knee: FAROM, MS 5/5 reflexes 2/4 Ankle: FAROM, MS 5/5 reflexes 2/4 Warm , nontender, pulse 2+ negative homans  Gait: Spontaneous, reciprocal, safe without an assistive device  Sensation RUE: sensation is intact to light touch, pinprick  and proprioception LUE: sensation is intact to light touch, pinprick  and proprioception RLE: sensation is intact to light touch, pinprick  and proprioception. Neg SLR. Neg MARIANA, Neg DERRICKIR LLE: sensation is intact to light touch, pinprick  and proprioception. Neg SLR. Neg MARIANA, Neg FADIR

## 2024-04-02 NOTE — DATA REVIEWED
[Other: ___] : [unfilled] [Plain X-Rays] : plain X-Rays [FreeTextEntry1] : Hemoglobin A1c is 6.8-avoid steroids if feasible BUNs/CR 8/0.59.  Patient should be able to tolerate NSAIDs  X-ray of the right shoulder performed on February 15, 2024 reveals unremarkable X-ray of the cervical spine performed on February 15, 2024 revealsStraightening of the lordotic curvature due to spasm.  Mild hypertrophic degeneration and small anterior osteophyte formation at the level of C5/6 otherwise Unremarkable

## 2024-04-02 NOTE — REVIEW OF SYSTEMS
[Joint Pain] : joint pain [Muscle Pain] : muscle pain [Joint Stiffness] : joint stiffness [Muscle Weakness] : muscle weakness [Fever] : no fever [Eye Pain] : no eye pain [Earache] : no earache [Chest Pain] : no chest pain [Dysuria] : no dysuria [Abdominal Pain] : no abdominal pain [Shortness Of Breath] : no shortness of breath [Skin Wound] : no skin wound [Skin Rash] : no skin rash [Dizziness] : no dizziness [Easy Bruising] : no tendency for easy bruising [Insomnia] : no insomnia

## 2024-04-04 ENCOUNTER — APPOINTMENT (OUTPATIENT)
Dept: PHYSICAL MEDICINE AND REHAB | Facility: CLINIC | Age: 64
End: 2024-04-04

## 2024-07-11 ENCOUNTER — LABORATORY RESULT (OUTPATIENT)
Age: 64
End: 2024-07-11

## 2024-07-11 ENCOUNTER — NON-APPOINTMENT (OUTPATIENT)
Age: 64
End: 2024-07-11

## 2024-07-11 ENCOUNTER — APPOINTMENT (OUTPATIENT)
Dept: INTERNAL MEDICINE | Facility: CLINIC | Age: 64
End: 2024-07-11
Payer: MEDICAID

## 2024-07-11 VITALS
DIASTOLIC BLOOD PRESSURE: 80 MMHG | HEART RATE: 82 BPM | TEMPERATURE: 97.6 F | BODY MASS INDEX: 36.86 KG/M2 | OXYGEN SATURATION: 98 % | RESPIRATION RATE: 17 BRPM | WEIGHT: 208 LBS | SYSTOLIC BLOOD PRESSURE: 128 MMHG | HEIGHT: 63 IN

## 2024-07-11 DIAGNOSIS — E55.9 VITAMIN D DEFICIENCY, UNSPECIFIED: ICD-10-CM

## 2024-07-11 DIAGNOSIS — R42 DIZZINESS AND GIDDINESS: ICD-10-CM

## 2024-07-11 DIAGNOSIS — E78.5 HYPERLIPIDEMIA, UNSPECIFIED: ICD-10-CM

## 2024-07-11 DIAGNOSIS — J30.9 ALLERGIC RHINITIS, UNSPECIFIED: ICD-10-CM

## 2024-07-11 DIAGNOSIS — L97.909 VARICOSE VEINS OF UNSPECIFIED LOWER EXTREMITY WITH BOTH ULCER OF UNSPECIFIED SITE AND INFLAMMATION: ICD-10-CM

## 2024-07-11 DIAGNOSIS — K76.0 FATTY (CHANGE OF) LIVER, NOT ELSEWHERE CLASSIFIED: ICD-10-CM

## 2024-07-11 DIAGNOSIS — E03.9 HYPOTHYROIDISM, UNSPECIFIED: ICD-10-CM

## 2024-07-11 DIAGNOSIS — I83.209 VARICOSE VEINS OF UNSPECIFIED LOWER EXTREMITY WITH BOTH ULCER OF UNSPECIFIED SITE AND INFLAMMATION: ICD-10-CM

## 2024-07-11 DIAGNOSIS — Z00.00 ENCOUNTER FOR GENERAL ADULT MEDICAL EXAMINATION W/OUT ABNORMAL FINDINGS: ICD-10-CM

## 2024-07-11 DIAGNOSIS — E11.9 TYPE 2 DIABETES MELLITUS W/OUT COMPLICATIONS: ICD-10-CM

## 2024-07-11 LAB
GLUCOSE BLDC GLUCOMTR-MCNC: 153
HBA1C MFR BLD HPLC: 7.1

## 2024-07-11 PROCEDURE — 93000 ELECTROCARDIOGRAM COMPLETE: CPT

## 2024-07-11 PROCEDURE — 99214 OFFICE O/P EST MOD 30 MIN: CPT | Mod: 25

## 2024-07-11 PROCEDURE — 99396 PREV VISIT EST AGE 40-64: CPT

## 2024-07-11 PROCEDURE — 82962 GLUCOSE BLOOD TEST: CPT

## 2024-07-11 PROCEDURE — 83036 HEMOGLOBIN GLYCOSYLATED A1C: CPT | Mod: QW

## 2024-07-11 RX ORDER — EMPAGLIFLOZIN 10 MG/1
10 TABLET, FILM COATED ORAL
Qty: 90 | Refills: 3 | Status: ACTIVE | COMMUNITY
Start: 2024-07-11 | End: 1900-01-01

## 2024-07-11 RX ORDER — MECLIZINE HYDROCHLORIDE 12.5 MG/1
12.5 TABLET ORAL
Qty: 30 | Refills: 0 | Status: ACTIVE | COMMUNITY
Start: 2024-07-11 | End: 1900-01-01

## 2024-07-13 LAB
ALBUMIN SERPL ELPH-MCNC: 4.2 G/DL
ALP BLD-CCNC: 113 U/L
ALT SERPL-CCNC: 36 U/L
ANION GAP SERPL CALC-SCNC: 13 MMOL/L
APPEARANCE: CLEAR
AST SERPL-CCNC: 23 U/L
BASOPHILS # BLD AUTO: 0.11 K/UL
BASOPHILS NFR BLD AUTO: 1.7 %
BILIRUB SERPL-MCNC: 0.4 MG/DL
BILIRUBIN URINE: NEGATIVE
BLOOD URINE: ABNORMAL
BUN SERPL-MCNC: 8 MG/DL
CALCIUM SERPL-MCNC: 8.8 MG/DL
CHLORIDE SERPL-SCNC: 103 MMOL/L
CHOLEST SERPL-MCNC: 218 MG/DL
CO2 SERPL-SCNC: 24 MMOL/L
CREAT SERPL-MCNC: 0.63 MG/DL
CREAT SPEC-SCNC: 187 MG/DL
EGFR: 99 ML/MIN/1.73M2
EOSINOPHIL # BLD AUTO: 0.52 K/UL
EOSINOPHIL NFR BLD AUTO: 8.1 %
FERRITIN SERPL-MCNC: 50 NG/ML
GLUCOSE QUALITATIVE U: NEGATIVE MG/DL
GLUCOSE SERPL-MCNC: 142 MG/DL
HCT VFR BLD CALC: 44 %
HDLC SERPL-MCNC: 46 MG/DL
IMM GRANULOCYTES NFR BLD AUTO: 0.2 %
IRON SERPL-MCNC: 66 UG/DL
KETONES URINE: NEGATIVE MG/DL
LDLC SERPL CALC-MCNC: 150 MG/DL
LEUKOCYTE ESTERASE URINE: NEGATIVE
LYMPHOCYTES NFR BLD AUTO: 35.3 %
MAN DIFF?: NORMAL
MCHC RBC-ENTMCNC: 29.4 PG
MCHC RBC-ENTMCNC: 32 GM/DL
MCV RBC AUTO: 91.7 FL
MICROALBUMIN 24H UR DL<=1MG/L-MCNC: 2.1 MG/DL
MICROALBUMIN/CREAT 24H UR-RTO: 11 MG/G
MONOCYTES # BLD AUTO: 0.37 K/UL
MONOCYTES NFR BLD AUTO: 5.8 %
NEUTROPHILS # BLD AUTO: 3.14 K/UL
NITRITE URINE: NEGATIVE
NONHDLC SERPL-MCNC: 172 MG/DL
PH URINE: 7
PLATELET # BLD AUTO: 283 K/UL
POTASSIUM SERPL-SCNC: 4.2 MMOL/L
PROT SERPL-MCNC: 6.8 G/DL
PROTEIN URINE: NORMAL MG/DL
RBC # BLD: 4.8 M/UL
RBC # FLD: 14.4 %
SODIUM SERPL-SCNC: 141 MMOL/L
T4 FREE SERPL-MCNC: 1.5 NG/DL
TRIGL SERPL-MCNC: 120 MG/DL
TSH SERPL-ACNC: 9.56 UIU/ML
UROBILINOGEN URINE: 1 MG/DL

## 2024-07-18 LAB
AST SERPL W P-5'-P-CCNC: 29 IU/L
CHOLEST SERPL-MCNC: 215 MG/DL
COMMENT:: NORMAL
FIBROSIS STAGE SERPL QL: NORMAL
FIBROSURE ALPHA 2 MACROGLOBULINS: 179 MG/DL
FIBROSURE ALT (SGPT): 40 IU/L
FIBROSURE APOLIPOPROTEIN A1: 136 MG/DL
FIBROSURE GGT: 35 IU/L
FIBROSURE SCORING: NORMAL
FIBROSURE TOTAL BILIRUBIN: 0.4 MG/DL
GLUCOSE SERPL-MCNC: 137 MG/DL
INTERPRETATIONS:: NORMAL
LIVER FIBR SCORE SERPL CALC.FIBROSURE: 0.18
Lab: NORMAL
NECROINFLAMMATORY ACT GRADE SERPL QL: NORMAL
NECROINFLAMMATORY ACT SCORE SERPL: 0.53
SERVICE CMNT-IMP: NORMAL
STEATOSIS GRADE: NORMAL
STEATOSIS SCORE: 0.77
STEATOSIS SCORING: NORMAL
TRIGL SERPL-MCNC: 125 MG/DL

## 2024-10-10 ENCOUNTER — APPOINTMENT (OUTPATIENT)
Dept: INTERNAL MEDICINE | Facility: CLINIC | Age: 64
End: 2024-10-10

## 2024-10-10 VITALS
HEART RATE: 63 BPM | HEIGHT: 63 IN | TEMPERATURE: 97.1 F | OXYGEN SATURATION: 99 % | RESPIRATION RATE: 18 BRPM | WEIGHT: 196 LBS | SYSTOLIC BLOOD PRESSURE: 124 MMHG | DIASTOLIC BLOOD PRESSURE: 80 MMHG | BODY MASS INDEX: 34.73 KG/M2

## 2024-10-10 DIAGNOSIS — E04.1 NONTOXIC SINGLE THYROID NODULE: ICD-10-CM

## 2024-10-10 DIAGNOSIS — I83.209 VARICOSE VEINS OF UNSPECIFIED LOWER EXTREMITY WITH BOTH ULCER OF UNSPECIFIED SITE AND INFLAMMATION: ICD-10-CM

## 2024-10-10 DIAGNOSIS — L97.909 VARICOSE VEINS OF UNSPECIFIED LOWER EXTREMITY WITH BOTH ULCER OF UNSPECIFIED SITE AND INFLAMMATION: ICD-10-CM

## 2024-10-10 DIAGNOSIS — E03.9 HYPOTHYROIDISM, UNSPECIFIED: ICD-10-CM

## 2024-10-10 DIAGNOSIS — K76.0 FATTY (CHANGE OF) LIVER, NOT ELSEWHERE CLASSIFIED: ICD-10-CM

## 2024-10-10 DIAGNOSIS — E11.9 TYPE 2 DIABETES MELLITUS W/OUT COMPLICATIONS: ICD-10-CM

## 2024-10-10 DIAGNOSIS — J30.9 ALLERGIC RHINITIS, UNSPECIFIED: ICD-10-CM

## 2024-10-10 DIAGNOSIS — E78.5 HYPERLIPIDEMIA, UNSPECIFIED: ICD-10-CM

## 2024-10-10 LAB
GLUCOSE BLDC GLUCOMTR-MCNC: 102
HBA1C MFR BLD HPLC: 6.2

## 2024-10-10 PROCEDURE — 83036 HEMOGLOBIN GLYCOSYLATED A1C: CPT | Mod: QW

## 2024-10-10 PROCEDURE — 82962 GLUCOSE BLOOD TEST: CPT

## 2024-10-10 PROCEDURE — 99214 OFFICE O/P EST MOD 30 MIN: CPT | Mod: 25

## 2024-10-12 PROBLEM — E04.1 THYROID NODULE: Status: ACTIVE | Noted: 2024-10-10

## 2024-10-12 LAB
ALBUMIN SERPL ELPH-MCNC: 4.2 G/DL
ALP BLD-CCNC: 133 U/L
ALT SERPL-CCNC: 18 U/L
ANION GAP SERPL CALC-SCNC: 13 MMOL/L
AST SERPL-CCNC: 13 U/L
BILIRUB SERPL-MCNC: 0.6 MG/DL
BUN SERPL-MCNC: 12 MG/DL
CALCIUM SERPL-MCNC: 9 MG/DL
CHLORIDE SERPL-SCNC: 103 MMOL/L
CHOLEST SERPL-MCNC: 175 MG/DL
CO2 SERPL-SCNC: 25 MMOL/L
CREAT SERPL-MCNC: 0.6 MG/DL
EGFR: 100 ML/MIN/1.73M2
GLUCOSE SERPL-MCNC: 107 MG/DL
HDLC SERPL-MCNC: 50 MG/DL
LDLC SERPL CALC-MCNC: 101 MG/DL
NONHDLC SERPL-MCNC: 124 MG/DL
POTASSIUM SERPL-SCNC: 4.1 MMOL/L
PROT SERPL-MCNC: 7.2 G/DL
SODIUM SERPL-SCNC: 141 MMOL/L
T4 FREE SERPL-MCNC: 1.6 NG/DL
TRIGL SERPL-MCNC: 133 MG/DL
TSH SERPL-ACNC: 6.94 UIU/ML

## 2024-10-17 ENCOUNTER — OUTPATIENT (OUTPATIENT)
Dept: OUTPATIENT SERVICES | Facility: HOSPITAL | Age: 64
LOS: 1 days | End: 2024-10-17
Payer: MEDICAID

## 2024-10-17 ENCOUNTER — APPOINTMENT (OUTPATIENT)
Dept: ULTRASOUND IMAGING | Facility: CLINIC | Age: 64
End: 2024-10-17

## 2024-10-17 ENCOUNTER — APPOINTMENT (OUTPATIENT)
Dept: ENDOCRINOLOGY | Facility: CLINIC | Age: 64
End: 2024-10-17

## 2024-10-17 VITALS
DIASTOLIC BLOOD PRESSURE: 87 MMHG | TEMPERATURE: 98.1 F | RESPIRATION RATE: 18 BRPM | WEIGHT: 198.19 LBS | SYSTOLIC BLOOD PRESSURE: 140 MMHG | HEART RATE: 69 BPM | OXYGEN SATURATION: 100 % | HEIGHT: 63 IN | BODY MASS INDEX: 35.12 KG/M2

## 2024-10-17 DIAGNOSIS — E04.1 NONTOXIC SINGLE THYROID NODULE: ICD-10-CM

## 2024-10-17 DIAGNOSIS — E03.9 HYPOTHYROIDISM, UNSPECIFIED: ICD-10-CM

## 2024-10-17 PROCEDURE — 76536 US EXAM OF HEAD AND NECK: CPT | Mod: 26

## 2024-10-17 PROCEDURE — 99204 OFFICE O/P NEW MOD 45 MIN: CPT

## 2024-10-17 PROCEDURE — 76536 US EXAM OF HEAD AND NECK: CPT

## 2025-01-09 ENCOUNTER — APPOINTMENT (OUTPATIENT)
Dept: INTERNAL MEDICINE | Facility: CLINIC | Age: 65
End: 2025-01-09
Payer: MEDICAID

## 2025-01-09 VITALS
BODY MASS INDEX: 35.79 KG/M2 | HEIGHT: 63 IN | HEART RATE: 70 BPM | TEMPERATURE: 97.8 F | WEIGHT: 202 LBS | SYSTOLIC BLOOD PRESSURE: 110 MMHG | DIASTOLIC BLOOD PRESSURE: 80 MMHG | RESPIRATION RATE: 17 BRPM | OXYGEN SATURATION: 98 %

## 2025-01-09 DIAGNOSIS — J30.9 ALLERGIC RHINITIS, UNSPECIFIED: ICD-10-CM

## 2025-01-09 DIAGNOSIS — K76.0 FATTY (CHANGE OF) LIVER, NOT ELSEWHERE CLASSIFIED: ICD-10-CM

## 2025-01-09 DIAGNOSIS — E78.5 HYPERLIPIDEMIA, UNSPECIFIED: ICD-10-CM

## 2025-01-09 DIAGNOSIS — M25.511 PAIN IN RIGHT SHOULDER: ICD-10-CM

## 2025-01-09 DIAGNOSIS — E11.9 TYPE 2 DIABETES MELLITUS W/OUT COMPLICATIONS: ICD-10-CM

## 2025-01-09 DIAGNOSIS — E04.1 NONTOXIC SINGLE THYROID NODULE: ICD-10-CM

## 2025-01-09 LAB
GLUCOSE BLDC GLUCOMTR-MCNC: 126
HBA1C MFR BLD HPLC: 6.4

## 2025-01-09 PROCEDURE — 99214 OFFICE O/P EST MOD 30 MIN: CPT | Mod: 25

## 2025-01-09 PROCEDURE — 83036 HEMOGLOBIN GLYCOSYLATED A1C: CPT | Mod: QW

## 2025-01-09 PROCEDURE — 82962 GLUCOSE BLOOD TEST: CPT

## 2025-01-09 PROCEDURE — 99401 PREV MED CNSL INDIV APPRX 15: CPT

## 2025-01-09 RX ORDER — ALBUTEROL SULFATE 90 UG/1
108 (90 BASE) AEROSOL, METERED RESPIRATORY (INHALATION)
Qty: 1 | Refills: 3 | Status: ACTIVE | COMMUNITY
Start: 2025-01-09 | End: 1900-01-01

## 2025-01-11 DIAGNOSIS — E03.9 HYPOTHYROIDISM, UNSPECIFIED: ICD-10-CM

## 2025-01-11 LAB
ALBUMIN SERPL ELPH-MCNC: 3.7 G/DL
ALP BLD-CCNC: 124 U/L
ALT SERPL-CCNC: 17 U/L
ANION GAP SERPL CALC-SCNC: 14 MMOL/L
AST SERPL-CCNC: 13 U/L
BILIRUB SERPL-MCNC: 0.3 MG/DL
BUN SERPL-MCNC: 12 MG/DL
CALCIUM SERPL-MCNC: 9 MG/DL
CHLORIDE SERPL-SCNC: 103 MMOL/L
CHOLEST SERPL-MCNC: 158 MG/DL
CO2 SERPL-SCNC: 23 MMOL/L
CREAT SERPL-MCNC: 0.54 MG/DL
EGFR: 102 ML/MIN/1.73M2
GLUCOSE SERPL-MCNC: 126 MG/DL
HDLC SERPL-MCNC: 45 MG/DL
LDLC SERPL CALC-MCNC: 81 MG/DL
NONHDLC SERPL-MCNC: 113 MG/DL
POTASSIUM SERPL-SCNC: 3.8 MMOL/L
PROT SERPL-MCNC: 6.4 G/DL
SODIUM SERPL-SCNC: 140 MMOL/L
T4 FREE SERPL-MCNC: 1.3 NG/DL
TRIGL SERPL-MCNC: 186 MG/DL
TSH SERPL-ACNC: 14.6 UIU/ML

## 2025-01-11 RX ORDER — LEVOTHYROXINE SODIUM 0.07 MG/1
75 TABLET ORAL
Qty: 30 | Refills: 5 | Status: ACTIVE | COMMUNITY
Start: 2025-01-11 | End: 1900-01-01

## 2025-01-16 ENCOUNTER — APPOINTMENT (OUTPATIENT)
Dept: ENDOCRINOLOGY | Facility: CLINIC | Age: 65
End: 2025-01-16
Payer: MEDICAID

## 2025-01-16 VITALS
SYSTOLIC BLOOD PRESSURE: 141 MMHG | RESPIRATION RATE: 16 BRPM | HEIGHT: 63 IN | OXYGEN SATURATION: 98 % | WEIGHT: 202 LBS | BODY MASS INDEX: 35.79 KG/M2 | DIASTOLIC BLOOD PRESSURE: 82 MMHG | HEART RATE: 65 BPM

## 2025-01-16 DIAGNOSIS — E03.9 HYPOTHYROIDISM, UNSPECIFIED: ICD-10-CM

## 2025-01-16 DIAGNOSIS — E04.1 NONTOXIC SINGLE THYROID NODULE: ICD-10-CM

## 2025-01-16 LAB — HBA1C MFR BLD HPLC: 6.5

## 2025-01-16 PROCEDURE — 99214 OFFICE O/P EST MOD 30 MIN: CPT

## 2025-03-27 ENCOUNTER — APPOINTMENT (OUTPATIENT)
Dept: ENDOCRINOLOGY | Facility: CLINIC | Age: 65
End: 2025-03-27

## 2025-04-10 ENCOUNTER — APPOINTMENT (OUTPATIENT)
Dept: INTERNAL MEDICINE | Facility: CLINIC | Age: 65
End: 2025-04-10

## 2025-04-15 ENCOUNTER — APPOINTMENT (OUTPATIENT)
Dept: ENDOCRINOLOGY | Facility: CLINIC | Age: 65
End: 2025-04-15

## 2025-04-16 ENCOUNTER — RX RENEWAL (OUTPATIENT)
Age: 65
End: 2025-04-16

## 2025-04-24 RX ORDER — ALBUTEROL SULFATE 90 UG/1
108 (90 BASE) INHALANT RESPIRATORY (INHALATION)
Qty: 8.5 | Refills: 0 | Status: ACTIVE | COMMUNITY
Start: 2025-04-16 | End: 1900-01-01

## 2025-05-20 ENCOUNTER — RX RENEWAL (OUTPATIENT)
Age: 65
End: 2025-05-20

## 2025-06-17 ENCOUNTER — RX RENEWAL (OUTPATIENT)
Age: 65
End: 2025-06-17